# Patient Record
Sex: MALE | Race: WHITE
[De-identification: names, ages, dates, MRNs, and addresses within clinical notes are randomized per-mention and may not be internally consistent; named-entity substitution may affect disease eponyms.]

---

## 2019-01-08 ENCOUNTER — HOSPITAL ENCOUNTER (EMERGENCY)
Dept: HOSPITAL 25 - UCCORT | Age: 44
Discharge: ANOTHER HOSPITAL | End: 2019-01-08
Payer: MEDICARE

## 2019-01-08 VITALS — SYSTOLIC BLOOD PRESSURE: 156 MMHG | DIASTOLIC BLOOD PRESSURE: 100 MMHG

## 2019-01-08 DIAGNOSIS — F10.10: ICD-10-CM

## 2019-01-08 DIAGNOSIS — F17.210: ICD-10-CM

## 2019-01-08 DIAGNOSIS — R60.9: Primary | ICD-10-CM

## 2019-01-08 PROCEDURE — 99212 OFFICE O/P EST SF 10 MIN: CPT

## 2019-01-08 PROCEDURE — G0463 HOSPITAL OUTPT CLINIC VISIT: HCPCS

## 2019-01-08 NOTE — XMS REPORT
Continuity of Care Document (CCD)

 Created on:2018



Patient:Hernan Plummer

Sex:Male

:1975

External Reference #:[id number]





Demographics







 Address  [address] [place] [address]

 

 Home Phone  [phone]

 

 Mobile Phone  [phone]

 

 Work Phone  [phone]

 

 Preferred Language  en

 

 Marital Status  Not  or 

 

 Holiness Affiliation  Unknown

 

 Race  White

 

 Ethnic Group  Not  or 









Author







 Name  Herminia Barragan









Support







 Name  Relationship  Address  Phone

 

 Ernestine Plummer  Mother  Unavailable  [phone]









Care Team Providers







 Name  Role  Phone

 

 Graciela Ceballos MD  Care Team Information   Unavailable

 

 Graciela Ceballos MD  Primary Care Physician  Unavailable









Payers







 Type  Date  Identification Numbers  Payment Provider  Subscriber

 

     Policy Number: [id number]  [employer]  Herann Plummer









 PayID: [id number]  [address] [address]









 [place] [address]









     Policy Number: [id number]  Medicaid  Hernan Plummer









 PayID: [id number]  [address]









 [place] [address]









   Expires: 2011  Policy Number: [id number]  [employer]  Hernan Plummer









 PayID: [id number]  [address] [address]









 [place] [address]







Advance Directives







 Description

 

 No Information Available







Problems







 Date  Description  Provider  Status

 

 Onset: 2018  Secondary polycythemia  Mariella Singh DO  Active

 

 Onset: 2018  Tobacco use  Tc Thomson MD  Active

 

 Onset: 2018  Hemorrhage of rectum and anus  Tc Thomson MD  Active

 

 Onset: 2018  Iron deficiency  Mariella Singh DO  Active

 

 Onset: 2018  Vitamin D deficiency  Mariella Singh DO  Active

 

 Onset: 2018  Vitamin B deficiency  Mariella Singh DO  Active

 

 Onset: 2018  Heavy drinker  Mariella Singh DO  Active

 

 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  
Active



   distal lower extremity    

 

 Onset: 2016  Other fecal abnormalities  Peyton Botello MD  Active

 

 Onset: 2016  Neuralgia  Peyton Botello MD  Active

 

 Onset: 2016  Late effect of spinal cord injury  Peyton Botello MD  Active







Family History







 Date  Family Member(s)  Problem(s)  Comments

 

   Father   due to Liver Disease  ()

 

   Father  Cirrhosis  

 

   Father  Diabetes  

 

   Mother  Diverticulitis  

 

   Maternal Grandmother  Crohn's Disease  







Social History







 Type  Date  Description  Comments

 

 Birth Sex    Unknown  

 

 Marital Status    Patient is   

 

 Lives With    Alone  

 

 Home Environment    Lives With  young daughter

 

 Occupation    Disabled  [employer] -



       finding work



       placement for clients

 

 Work Status    Disabled  

 

 Smokeless Tobacco    Never Used Smokeless  



     Tobacco  

 

 ETOH Use    Uses Alcohol Daily  10 vodkas daily

 

 Tobacco Use  Start: Unknown  Patient is a current  



     smoker, smokes every  



     day  

 

 Recreational Drug Use    Marijuana  Daily

 

 Smoking Status  Reviewed: 18  Patient is a current  



     smoker, smokes every  



     day  







Allergies, Adverse Reactions, Alerts







 Description

 

 No Known Drug Allergies







Medications







 Medication  Date  Status  Form  Strength  Qnty  SIG  Indications  Ordering



                 Provider

 

 Lovenox    Active  Solution  40mg/0.4M  12ml  40 mg sq q12    Kai      L        Mariella,



                 

 

 Xarelto    Active  Tablets  20mg  30tab  1 tabl by            s  mouth every    Mariella,



             day    DO

 

 Omeprazole    Active  Capsules DR  20mg    1 by mouth    Unknown



   /          every day    

 

 Lyrica    Active  Capsules  100mg    1 cap by    Unknown



   /0000          mouth twice    



             a day    

 

 Tizanidine HCL    Active  Capsules  2mg    2 tab by    Unknown



   /0000          mouth at    



             bedtime    

 

 Flonase Allergy    Active  Suspension  50mcg/Act    2 spray each    
Unknown



 Relief  /0000          nare every    



             day    

 

 ALL Day Allergy    Active  Tablets  10mg    1 tablet by    Unknown



   /0000          mouth every    



             day    

 

 Flovent Diskus    Active  Aerosol  250mcg/Bl    use 1    Unknown



   /0000      ist    inhalation    



             twice a day    



             as needed    



             (max daily    



             dose: 2    



             inhalation)    

 

 Ventolin HFA    Active  Aerosol  108(90Bas    take 2 puffs    Unknown



   /0000      e)    every 6    



         mcg/Act    hours as    



             needed for    



             shortness of    



             breath.    

 

                 

 

 Golytely    Hx  Solution  236gm  4000m  drink half  K62.5  Berto



       Rec    l  the evening    , Tc,



   -          before and    MD



             half the    



   /2018          morning of    



             the    



             procedure (1    



             cup every    



             10')    

 

 Dulcolax    Hx  Tablets DR  5mg  4tabs  4 tablets  K62.5  Berto



             taken a 8pm    , Tc,



   -          the day    MD



             before the    



             procedure    

 

 Magnesium    Hx  Solution  1.745GM/3  296ml  one bottle  K62.5  Berto



 Citrate        0ML    at 12 noon    , Tc,



   -          the day    MD



             before    



             colonoscopy    

 

 Folic Acid    Hx  Tablets  1mg  30tab  1 tabl by    Bo,



           s  mouth every    Mariella,



   -          day    DO



                 

 

 Ergocarciferol    Hx  Capsules  85936P  6caps  1 cap po q    Boufal,



             week    Mariella,



   -              DO



                 

 

 Neurontin  10/21  Hx  Capsules  300mg  180ca  600 mg (2  M79.2  Cator,



           ps  tabs) by    MD Peyton



   -          mouth 3    



             times a day    



                 

 

 Neurontin  10/21  Hx  Capsules  300mg  180ca  600 mg (2  M79.2  Cator,



           ps  tabs) by    MD Peyton



   -          mouth 3    



             times a day    



                 

 

 Neurontin    Hx  Capsules  300mg  90cap  300 mg by  M79.2  Cator,



           s  mouth 3    MD Peyton



   -          times a day    



   10/21              



   /2016              

 

 Fluticasone    Hx  Suspension  50mcg/Act    1 spray to    Unknown



 Propionate  /0000          each nare    



   -          every day    



                 

 

 Xarelto    Hx  Tablets  15mg    Take 1    Unknown



   /0000          Tablet By    



   -          Mouth Twice    



             A Day For           Days    







Medications Administered in Office







 Medication  Date  Status  Form  Strength  Qnty  SIG  Indications  Ordering



                 Provider

 

 Vitamin B12    Administered  Injection          Boufal,



 Injection 1000  018              Mariella,



 mcg/Ml                DO

 

 Vitamin B12    Administered  Injection          Boufal,



 Injection 1000  018              Mariella,



 mcg/Ml                DO







Immunizations







 Description

 

 No Information Available







Vital Signs







 Date  Vital  Result  Comment

 

 2018  3:31pm  BP Systolic Sitting Left Arm  128 mmHg  









 BP Diastolic Sitting Left Arm  90 mmHg  

 

 Heart Rate  93 /min  

 

 Respiratory Rate  16 /min  

 

 Height  69 inches  5'9"

 

 Weight  194.00 lb  

 

 BMI (Body Mass Index)  28.6 kg/m2  

 

 BSA (Body Surface Area)  2.04 m2  

 

 Ideal body weight in kilograms  73 kg  

 

 O2 % BldC Oximetry  95 %  









 2018  9:30am  BP Systolic Sitting Right Arm  121 mmHg  









 BP Diastolic Sitting Right Arm  82 mmHg  

 

 Body Temperature  96.1 F  

 

 Heart Rate  100 /min  

 

 Respiratory Rate  20 /min  

 

 Height  69 inches  5'9"

 

 Weight  189.25 lb  

 

 BMI (Body Mass Index)  27.9 kg/m2  

 

 BSA (Body Surface Area)  2.02 m2  

 

 Ideal body weight in kilograms  73 kg  

 

 O2 % BldC Oximetry  93 %  

 

 Pain Level  7  Legs









 2018  9:39am  BP Systolic Sitting Left Arm  126 mmHg  









 BP Diastolic Sitting Left Arm  90 mmHg  

 

 Heart Rate  76 /min  

 

 Respiratory Rate  16 /min  

 

 Height  69 inches  5'9"

 

 Weight  189.00 lb  

 

 BMI (Body Mass Index)  27.9 kg/m2  

 

 BSA (Body Surface Area)  2.02 m2  

 

 Ideal body weight in kilograms  73 kg  









 2018 11:25am  BP Systolic  121 mmHg  Left









 BP Diastolic  79 mmHg  Left

 

 Body Temperature  98.6 F  

 

 Heart Rate  101 /min  

 

 Respiratory Rate  20 /min  

 

 O2 % BldC Oximetry  96 %  

 

 Pain Level  7  Back, Legs









 2018 11:40am  BP Systolic  104 mmHg  Left









 BP Diastolic  71 mmHg  Left

 

 Body Temperature  98.4 F  

 

 Heart Rate  90 /min  

 

 Respiratory Rate  20 /min  

 

 Weight  184.00 lb  

 

 O2 % BldC Oximetry  96 %  

 

 Pain Level  7  Spine









 2018  1:52pm  BP Systolic  119 mmHg  









 BP Diastolic  77 mmHg  

 

 Body Temperature  98.5 F  

 

 Heart Rate  105 /min  

 

 Respiratory Rate  18 /min  

 

 Weight  179.38 lb  

 

 O2 % BldC Oximetry  97 %  

 

 Pain Level  7  legs and feet









 2018 10:18am  BP Systolic  117 mmHg  









 BP Diastolic  79 mmHg  

 

 Body Temperature  98.3 F  

 

 Heart Rate  98 /min  

 

 Respiratory Rate  18 /min  

 

 Weight  179.12 lb  

 

 O2 % BldC Oximetry  97 %  

 

 Pain Level  7  All over









 2018 11:03am  BP Systolic  115 mmHg  









 BP Diastolic  75 mmHg  

 

 Body Temperature  97.8 F  

 

 Heart Rate  94 /min  

 

 Respiratory Rate  16 /min  

 

 Height  69.75 inches  5'9.75"

 

 Weight  181.00 lb  

 

 BMI (Body Mass Index)  26.2 kg/m2  

 

 BSA (Body Surface Area)  2.00 m2  

 

 Ideal body weight in kilograms  75 kg  

 

 O2 % BldC Oximetry  99 %  

 

 Pain Level  7  right leg









 10/21/2016  9:53am  BP Systolic Sitting Left Arm  122 mmHg  









 BP Diastolic Sitting Left Arm  78 mmHg  

 

 Body Temperature  99.0 F  

 

 Heart Rate  99 /min  

 

 Height  69 inches  5'9"

 

 Weight  183.00 lb  

 

 BMI (Body Mass Index)  27.0 kg/m2  

 

 BSA (Body Surface Area)  1.99 m2  

 

 O2 % BldC Oximetry  97 %  ra









 2016  8:43am  BP Systolic Sitting Left Arm  132 mmHg  









 BP Diastolic Sitting Left Arm  78 mmHg  

 

 Body Temperature  98.4 F  

 

 Heart Rate  98 /min  

 

 Height  69 inches  5'9"

 

 Weight  183.00 lb  

 

 BMI (Body Mass Index)  27.0 kg/m2  

 

 BSA (Body Surface Area)  1.99 m2  

 

 Ideal body weight in kilograms  73 kg  

 

 O2 % BldC Oximetry  98 %  ra







Results







 Test  Date  Facility  Test  Result  H/L  Range  Note

 

 CBC  2018  [facility]  White Blood  6.5 K/uL  N  3.4-10.5  1



 W/Automated    [address]  Count        



 [place] [address] [phone]          









 Red Blood Count  5.99 M/uL  High  4.20-5.80  

 

 Hemoglobin  17.4 gm/dL  High  12.8-17.0  

 

 Hematocrit  52.5 %  High  38.0-48.0  

 

 Mean Cell Volume  87.6 fl  N  80.0-96.0  

 

 Mean Corpuscular HGB  29.0 pg  N  27.0-33.0  

 

 Mean Corpuscular HGB Conc  33.1 g/dL  N  31.7-36.0  

 

 Platelet Count  159 K/uL  N  155-360  

 

 Red Cell Distri Width SD  48.3 fl  N  36-51  

 

 Red Cell Distri Width %CV  15.1 %  N  11.6-15.8  

 

 Mean Platelet Volume  10.0 fL  N  6.6-10.6  

 

 Neut%  70.3 %  N  33.0-73.0  

 

 Lymph %  17.0 %  Low  20.0-42.0  

 

 Mono %  10.4 %  High  0.0-10.0  

 

 Eo%  1.7 %  N  0.0-6.6  

 

 Bas%  0.6 %  N  0.0-1.1  

 

 Neut#  4.55 K/uL  N  1.8-7.0  

 

 Lymph #  1.10 K/uL  N  1.0-4.0  

 

 Mono #  0.67 K/uL  N  0.0-0.8  

 

 Eos #  0.11 K/uL  N  0.0-0.5  

 

 Baso #  0.04 K/uL  N  0.0-0.1  









 Iron-Tibc-%Sat  2018  [facility]  Serum Iron  56 g/dL  Low    



     [address] [place] [address] [phone]          









 Total Iron Binding Capacity  499 g/dL  High  250-450  

 

 Transferrin %Saturation  11 %  Low  12-57  









 Laboratory test  2018  [facility]  Ferritin  16 ng/mL  Low    



 finding    [address] [place] [address] [phone]          

 

 Comprehensive  10/25/2018  [facility]  Glucose  132 mg/dL  High    2



 Metabolic Panel    [address] [place] [address] [phone]          









 BUN  6 mg/dL  Low  7-18  

 

 Creatinine  1.0 mg/dL  N  0.6-1.3  

 

 Glom Filtration Rate, Estimate  >60 mL/min    >60  

 

 If African American  >60 mL/min    >60  3

 

 BUN/Creat  6.0 ratio      

 

 Sodium  137 mmol/L  N  136-145  

 

 Potassium  3.5 mmol/L  N  3.5-5.1  

 

 Chloride  96 mmol/L  Low    

 

 Carbon Dioxide  32 mmol/L  N  21-32  

 

 Anion Gap  9 mEq/L  N  8-16  

 

 Calcium  8.7 mg/dL  N  8.5-10.1  

 

 Total Protein  7.0 g/dL  N  6.4-8.2  

 

 Albumin  3.5 g/dL  N  3.4-5.0  

 

 Globulin  3.5 g/dL  N  1.9-4.3  

 

 Alb/Glob  1.0 ratio      

 

 Bilirubin,Total  1.3 mg/dL  High  0.2-1.0  

 

 Sgot/Ast  67 U/L  High  15-37  

 

 SGPT/Alt  59 U/L  N  12-78  

 

 Alkaline Phosphatase  95 U/L  N    









 CBS W/Automated Diff  10/25/2018  [facility]  White Blood  9.1 K/uL  N  3.4-10.5  



     [address] [place] [address] [phone]          









 Red Blood Count  6.01 M/uL  High  4.20-5.80  

 

 Hemoglobin  17.7 gm/dL  High  12.8-17.0  

 

 Hematocrit  53.7 %  High  38.0-48.0  

 

 Mean Cell Volume  89.4 fl  N  80.0-96.0  

 

 Mean Corpuscular HGB  29.5 pg  N  27.0-33.0  

 

 Mean Corpuscular HGB Conc  33.0 g/dL  N  31.7-36.0  

 

 Platelet Count  130 K/uL    155-360  

 

 Red Cell Distri Width SD  50.0 fl  N  36-51  

 

 Red Cell Distri Width %CV  15.5 %  N  11.6-15.8  

 

 Mean Platelet Volume  11.1 fL    6.6-10.6  

 

 Neut%  80.6 %  High  33.0-73.0  

 

 Lymph %  10.0 %  Low  20.0-42.0  

 

 Mono %  8.5 %  N  0.0-10.0  

 

 Eo%  0.7 %  N  0.0-6.6  

 

 Bas%  0.2 %  N  0.0-1.1  

 

 Neut#  7.31 K/uL  High  1.8-7.0  

 

 Lymph #  0.91 K/uL  Low  1.0-4.0  

 

 Mono #  0.77 K/uL  N  0.0-0.8  

 

 Eos #  0.06 K/uL  N  0.0-0.5  

 

 Baso #  0.02 K/uL  N  0.0-0.1  









 Iron-Tibc-%Sat  10/25/2018  [facility]  Serum Iron  134 g/dL  N    



     [address], [place] [id number] [phone]          









 Total Iron Binding Capacity  461 g/dL  High  250-450  

 

 Transferrin %Saturation  29 %  N  12-57  









 Laboratory test  10/25/2018  [facility]  Ferritin  37 ng/mL  N    



 finding    [address], [place] [id number] [phone]          

 

 Vitamin B12 And  10/25/2018  [facility]  Vitamin B12  438 pg/mL  N  193-986  



 Folate    [address] [place] [address] [phone]          









 Folic Acid  19.4 ng/mL  High  3.1-17.5  









 Slide Review  10/25/2018  [facility]  Slide Review  .      4



     [address] [place] [address] [phone]          

 

 Comprehensive  2018  [facility]  Glucose  107 mg/dL  High    5



 Metabolic Panel    [address] [place] [address] [phone]          









 BUN  2 mg/dL  Low  7-18  

 

 Creatinine  0.8 mg/dL  N  0.6-1.3  

 

 Glom Filtration Rate, Estimate  >60 mL/min    >60  

 

 If African American  >60 mL/min    >60  6

 

 BUN/Creat  2.5 ratio      

 

 Sodium  133 mmol/L  Low  136-145  

 

 Potassium  3.6 mmol/L  N  3.5-5.1  

 

 Chloride  93 mmol/L  Low    

 

 Carbon Dioxide  33 mmol/L  High  21-32  

 

 Anion Gap  7 mEq/L  Low  8-16  

 

 Calcium  8.4 mg/dL  Low  8.5-10.1  

 

 Total Protein  6.7 g/dL  N  6.4-8.2  

 

 Albumin  3.4 g/dL  N  3.4-5.0  

 

 Globulin  3.3 g/dL  N  1.9-4.3  

 

 Alb/Glob  1.0 ratio      

 

 Bilirubin,Total  1.3 mg/dL  High  0.2-1.0  

 

 Sgot/Ast  77 U/L  High  15-37  

 

 SGPT/Alt  73 U/L  N  12-78  

 

 Alkaline Phosphatase  93 U/L  N    









 CBS W/Automated Diff  2018  [facility]  White Blood  7.2 K/uL  N  3.4-10.5  



     [address]  Count        



     [place], [place] [id number] [phone]          









 Red Blood Count  5.75 M/uL  N  4.20-5.80  

 

 Hemoglobin  16.7 gm/dL  N  12.8-17.0  

 

 Hematocrit  48.8 %  High  38.0-48.0  

 

 Mean Cell Volume  84.9 fl  N  80.0-96.0  

 

 Mean Corpuscular HGB  29.0 pg  N  27.0-33.0  

 

 Mean Corpuscular HGB Conc  34.2 g/dL  N  31.7-36.0  

 

 Platelet Count  122 K/uL  Low  155-360  

 

 Red Cell Distri Width SD  54.9 fl  High  36-51  

 

 Red Cell Distri Width %CV  18.5 %  High  11.6-15.8  

 

 Mean Platelet Volume  10.3 fL  N  6.6-10.6  

 

 Neut%  73.6 %  High  33.0-73.0  

 

 Lymph %  13.2 %  Low  20.0-42.0  

 

 Mono %  11.3 %  High  0.0-10.0  

 

 Eo%  1.5 %  N  0.0-6.6  

 

 Bas%  0.4 %  N  0.0-1.1  

 

 Neut#  5.27 K/uL  N  1.8-7.0  

 

 Lymph #  0.95 K/uL  Low  1.0-4.0  

 

 Mono #  0.81 K/uL  High  0.0-0.8  

 

 Eos #  0.11 K/uL  N  0.0-0.5  

 

 Baso #  0.03 K/uL  N  0.0-0.1  









 Iron-Tibc-%Sat  2018  [facility]  Serum Iron  41 g/dL  Low    



     [address], [place] [id number] [phone]          









 Total Iron Binding Capacity  483 g/dL  High  250-450  

 

 Transferrin %Saturation  8 %  Low  12-57  









 Laboratory test  2018  [facility]  Ferritin  129 ng/mL  N    



 finding    [address] [place] [address] [phone]          

 

 Vitamin B12 And  2018  [facility]  Vitamin B12  504 pg/mL  N  193-986  



 Folate    [address] [place] [address] [phone]          









 Folic Acid  > 20.0 ng/mL  High  3.1-17.5  









 Comprehensive Metabolic  2018  [facility]  Glucose  90 mg/dL  N    7



 Panel    [address], [place] [id number] [phone]          









 BUN  2 mg/dL  Low  7-18  

 

 Creatinine  0.8 mg/dL  N  0.6-1.3  

 

 Glom Filtration Rate, Estimate  >60 mL/min    >60  

 

 If African American  >60 mL/min    >60  8

 

 BUN/Creat  2.5 ratio      

 

 Sodium  135 mmol/L  Low  136-145  

 

 Potassium  3.6 mmol/L  N  3.5-5.1  

 

 Chloride  96 mmol/L  Low    

 

 Carbon Dioxide  28 mmol/L  N  21-32  

 

 Anion Gap  11 mEq/L  N  8-16  

 

 Calcium  8.6 mg/dL  N  8.5-10.1  

 

 Total Protein  7.1 g/dL  N  6.4-8.2  

 

 Albumin  3.6 g/dL  N  3.4-5.0  

 

 Globulin  3.5 g/dL  N  1.9-4.3  

 

 Alb/Glob  1.0 ratio      

 

 Bilirubin,Total  1.5 mg/dL  High  0.2-1.0  

 

 Sgot/Ast  80 U/L  High  15-37  

 

 SGPT/Alt  62 U/L  N  12-78  

 

 Alkaline Phosphatase  103 U/L  N    









 CBS W/Automated Diff  2018  [facility]  White Blood  7.9 K/uL  N  3.4-10.5  



     [address]  Count        



     [place] [address] [phone]          









 Red Blood Count  5.79 M/uL  N  4.20-5.80  

 

 Hemoglobin  16.8 gm/dL  N  12.8-17.0  

 

 Hematocrit  48.9 %  High  38.0-48.0  

 

 Mean Cell Volume  84.5 fl  N  80.0-96.0  

 

 Mean Corpuscular HGB  29.0 pg  N  27.0-33.0  

 

 Mean Corpuscular HGB Conc  34.4 g/dL  N  31.7-36.0  

 

 Platelet Count  121 K/uL  Low  155-360  

 

 Red Cell Distri Width SD  55.4 fl  High  36-51  

 

 Red Cell Distri Width %CV  19.3 %  High  11.6-15.8  

 

 Mean Platelet Volume  10.7 fL  High  6.6-10.6  

 

 Neut%  73.7 %  High  33.0-73.0  

 

 Lymph %  13.7 %  Low  20.0-42.0  

 

 Mono %  10.6 %  High  0.0-10.0  

 

 Eo%  1.5 %  N  0.0-6.6  

 

 Bas%  0.5 %  N  0.0-1.1  

 

 Neut#  5.84 K/uL  N  1.8-7.0  

 

 Lymph #  1.09 K/uL  N  1.0-4.0  

 

 [place] #  0.84 K/uL  High  0.0-0.8  

 

 Eos #  0.12 K/uL  N  0.0-0.5  

 

 Baso #  0.04 K/uL  N  0.0-0.1  









 Laboratory test  2018  [facility]  Vitamin  54.3    30.0-100.0  9



 finding    [address]  D,25-Hydroxy  ng/mL      



     [place] [address] [phone]          

 

 Vitamin B12 And  2018  [facility]  Vitamin B12  359 pg/mL  N  193-986  



 Folate    [address]          



     [place] [address] [phone]          









 Folic Acid  > 20.0 ng/mL  High  3.1-17.5  









 Laboratory test  2018  [facility]  Ferritin  126 ng/mL  N    



 finding    [address]          



     [place] [address] [phone]          

 

 Iron-Tibc-%Sat  2018  [facility]  Serum Iron  42 g/dL  Low    



     [address] [place] [address]          



     [phone]          









 Total Iron Binding Capacity  525 g/dL  High  250-450  

 

 Transferrin %Saturation  8 %  Low  12-57  









 Laboratory test  2018  [facility]  Ferritin  12 ng/mL  Low    10



 finding    [address] [place] [address]          



     [phone]          

 

 Vitamin B12 And  2018  [facility]  Vitamin B12  310 pg/mL  N  193-986  



 Folate    [address] [place] [address]          



     [phone]          









 Folic Acid  5.1 ng/mL  N  3.1-17.5  









 Laboratory  2018  [facility]  Vitamin  17.6  Low  30.0-100.0  11



 test finding    [address]  D,25-Hydroxy  ng/mL      



     [place] [address]          



     [phone]          









 Sedimentation Rate  1 mm/hr  N  0-15  12

 

 Gamma Glutamyl Transpeptidase  48 U/L  N  5-85  

 

 Fibrinogen  286 mg/dL  N  200-467  13









 Factor V Leiden Mutation  2018  [facility]  Factor V  (SEE      14



     [address]  [place]  NOTE)      



     [place] [address]          



     [phone]          

 

 Methylenetetrahydrofolate  2018  [facility]  MTHFR,Dna  (SEE      15



 Redu    [address]  Analysis  NOTE)      



     [place] [address]          



     [phone]          

 

 Protein Electro.,S  2018  [facility]  Protein,Tot  5.9 g/dL  Low  6.0  



     [address] [facility]  al,Serum      -8.  



     [place] [address]        5  



     [phone]          









 Albumin  3.1 g/dL    2.9-4.4  

 

 Alpha-1-Globulin  0.3 g/dL    0.0-0.4  

 

 Alpha-2-Globulin  0.9 g/dL    0.4-1.0  

 

 Beta Globulin  0.9 g/dL    0.7-1.3  

 

 Gamma Globulin  0.8 g/dL    0.4-1.8  

 

 M-Surinder  Not Observed g/dL    Not Observed  

 

 Globulin, Total  2.8 g/dL    2.2-3.9  

 

 A/G Ratio  1.1    0.7-1.7  

 

 Please Note:  (SEE NOTE)      16

 

 P E Interpretation, Serum  (SEE NOTE)      17









 Iron-Tibc-%Sat  2018  [facility]  Serum Iron  20 g/dL  Low    



     [address] [place] [address] [phone]          









 Total Iron Binding Capacity  489 g/dL  High  250-450  

 

 Transferrin %Saturation  4 %  Low  12-57  









 CBS W/Automated Diff  2018  [facility]  White Blood  4.6 K/uL  N  3.4-10.5  



     [address]  Count        



     [place] [address] [phone]          









 Red Blood Count  5.68 M/uL  N  4.20-5.80  

 

 Hemoglobin  16.7 gm/dL  N  12.8-17.0  

 

 Hematocrit  50.0 %  High  38.0-48.0  

 

 Mean Cell Volume  88.0 fl  N  80.0-96.0  

 

 Mean Corpuscular HGB  29.4 pg  N  27.0-33.0  

 

 Mean Corpuscular HGB Conc  33.4 g/dL  N  31.7-36.0  

 

 Platelet Count  181 K/uL  N  155-360  

 

 Red Cell Distri Width SD  54.1 fl  High  36-51  

 

 Red Cell Distri Width %CV  16.8 %  High  11.6-15.8  

 

 Mean Platelet Volume  9.7 fL  N  6.6-10.6  

 

 Neut%  63.2 %  N  33.0-73.0  

 

 Lymph %  22.7 %  N  20.0-42.0  

 

 Mono %  10.4 %  High  0.0-10.0  

 

 Eo%  2.4 %  N  0.0-6.6  

 

 Bas%  1.3 %  High  0.0-1.1  

 

 Neut#  2.92 K/uL  N  1.8-7.0  

 

 Lymph #  1.05 K/uL  N  1.0-4.0  

 

 Mono #  0.48 K/uL  N  0.0-0.8  

 

 Eos #  0.11 K/uL  N  0.0-0.5  

 

 Baso #  0.06 K/uL  N  0.0-0.1  









 Comprehensive Metabolic  2018  [facility]  Glucose  79 mg/dL  N    



 Panel    [address] [place] [address] [phone]          









 BUN  3 mg/dL  Low  7-18  

 

 Creatinine  0.7 mg/dL  N  0.6-1.3  

 

 Glom Filtration Rate, Estimate  >60 mL/min    >60  

 

 If African American  >60 mL/min    >60  18

 

 BUN/Creat  4.2 ratio      

 

 Sodium  140 mmol/L  N  136-145  

 

 Potassium  3.6 mmol/L  N  3.5-5.1  

 

 Chloride  98 mmol/L  N    

 

 Carbon Dioxide  31 mmol/L  N  21-32  

 

 Anion Gap  11 mEq/L  N  8-16  

 

 Calcium  8.3 mg/dL  Low  8.5-10.1  

 

 Total Protein  6.6 g/dL  N  6.4-8.2  

 

 Albumin  3.1 g/dL  Low  3.4-5.0  

 

 Globulin  3.5 g/dL  N  1.9-4.3  

 

 Alb/Glob  0.9 ratio      

 

 Bilirubin,Total  0.5 mg/dL  N  0.2-1.0  

 

 Sgot/Ast  60 U/L  High  15-37  

 

 SGPT/Alt  52 U/L  N  12-78  

 

 Alkaline Phosphatase  116 U/L  N    









 1  I82.401

 

 2  E61.1

 

 3  Note:



   Persistent reduction for 3 months or more in an eGFR <60



   mL/min/1.73 m2 defines CKD.  Patients with eGFR values >/=60



   mL/min/1.73 m2 may also have CKD if evidence of persistent



   proteinuria is present.



   



   The original MDRD equation for estimated GFR is not valid



   for patients less than 18 years of age.



   



   Additional information may be found at www.k[facility].org.

 

 4  Instrument flagged sample for slide review.



   Less than 10% Bands seen, no other immature WBC's seen.



   RBC morphology essentially normal.



   Platelet estimate=NORMAL

 

 5  E61.1 E55.9 E53.9 I82.401

 

 6  Note:



   Persistent reduction for 3 months or more in an eGFR <60



   mL/min/1.73 m2 defines CKD.  Patients with eGFR values >/=60



   mL/min/1.73 m2 may also have CKD if evidence of persistent



   proteinuria is present.



   



   The original MDRD equation for estimated GFR is not valid



   for patients less than 18 years of age.



   



   Additional information may be found at www.kdoqi.org.

 

 7  E61.1 E61.1 S24.152S I.82.401

 

 8  Note:



   Persistent reduction for 3 months or more in an eGFR <60



   mL/min/1.73 m2 defines CKD.  Patients with eGFR values >/=60



   mL/min/1.73 m2 may also have CKD if evidence of persistent



   proteinuria is present.



   



   The original MDRD equation for estimated GFR is not valid



   for patients less than 18 years of age.



   



   Additional information may be found at www.kdoqi.org.

 

 9  Vitamin D deficiency has been defined by the [place] of



   Medicine and an Endocrine Society practice guideline as a



   level of serum 25-OH vitamin D less than 20 ng/mL (1,2).



   The Endocrine Society went on to further define vitamin D



   insufficiency as a level between 21 and 29 ng/mL (2).



   1. IOM ([place] [employer]). 2010. Dietary reference



   intakes for calcium and D. [place]: The



   National Academies Press.



   2. Gabino Clark, Briseyda GREWAL, et al.



   Evaluation, treatment, and prevention of vitamin D



   deficiency: an Endocrine Society clinical practice



   guideline. JCEM. 2011; 96(7):1911-30.



   Performed at:   [facility]  [id number]



   : Araceli B Reyes MD, Phone:  [phone]

 

 10  L71.841S,E42.816

 

 11  Vitamin D deficiency has been defined by the [place] of



   Medicine and an Endocrine Society practice guideline as a



   level of serum 25-OH vitamin D less than 20 ng/mL (1,2).



   The Endocrine Society went on to further define vitamin D



   insufficiency as a level between 21 and 29 ng/mL (2).



   1. IOM ([place]). 2010. Dietary reference



   intakes for calcium and D. [place]: The



   National Academies Press.



   2. Gabino Clark Bischoff-Ferrari HA, et al.



   Evaluation, treatment, and prevention of vitamin D



   deficiency: an Endocrine Society clinical practice



   guideline. JCEM. 2011; 96(7):1911-30.



   Performed at:  [facility]  [id number]



   : Araceli B Reyes MD, Phone:  [phone]

 

 12  Method: Sediplast Modified Westergren

 

 13  Is patient on heparin protocol? N



   Is patient on anticoagulants? <Blank>

 

 14  Result:  Negative (no mutation found)



   Factor V Leiden is a specific mutation (R506Q) in the factor



   V gene that is associated with an increased risk of venous



   thrombosis. Factor V Leiden is more resistant to



   inactivation by activated protein C.  As a result, factor V



   persists in the circulation leading to a mild hyper-



   coagulable state.  The Leiden mutation accounts for 90% -



   95% of APC resistance.  Factor V Leiden has been reported in



   patients with deep vein thrombosis, pulmonary embolus,



   central retinal vein occlusion, cerebral sinus thrombosis



   and hepatic vein thrombosis. Other risk factors to be



   considered in the workup for venous thrombosis include the



   D36670P mutation in the factor II (prothrombin) gene,



   protein S and C deficiency, and antithrombin deficiencies.



   Anticardiolipin antibody and lupus anticoagulant analysis



   may be appropriate for certain patients, as well as



   homocysteine levels.



   Contact your local [facility] for information on how to order



   additional testing if desired.



   **Genetic counselors are available for health care



   providers to**   discuss results at [phone]).



   Methodology:



   DNA analysis of the Factor V gene was performed by allele-



   specific PCR. The diagnostic sensitivity and specificity is



   >99% for both. Molecular-based testing is highly accurate,



   but as in any laboratory test, diagnostic errors may occur.



   All test results must be combined with clinical information



   for the most accurate interpretation.



   This test was developed and its performance characteristics



   determined by [facility]. It has not been cleared or approved



   by the Food and Drug Administration.



   References:



   Robbie NARANJO (1996).  Clin Lab Med 16:169-186.



   Ania Enrique, PhD, FACMG



   Azul Acosta, PhD, FACMG



   Nette Gray M.S., PhD, FACMG



   Sarai Barriga, PhD, FACMG



   Es Muro, PhD, FACMG



   Beni Moura PhD, FAC



   Performed at:  [facility]



   [address], [place], [place]  [id number]



   : Toni Thomas MD, Phone:  [phone]

 

 15  Result: NEGATIVE (No mutation identified)



   Interpretation:



   The MTHFR C677T and R6860S variants were not identified.



   While the individual does not possess either of these



   factors, other risk factors may be detected through



   systematic clinical laboratory analysis.



   Hyperhomocysteinemia may occur due to mutations in enzymes



   other than MTHFR that are involved in homocysteine



   metabolism, or arise due to acquired factors such as



   folate, vitamin B6 or vitamin B12 deficiency. Therefore,



   this individual's MTHFR result does not guarantee a normal



   homocysteine level. In the evaluation of vascular and



   obstetric risk, consider measuring fasting homocysteine.



   Methylenetetrahydrofolate reductase (MTHFR) is a key enzyme in the



   folate pathway and is responsible for the metabolism of homocysteine.



   There are two common variants in the MTHFR gene, c.655c>T



   (p.[id number]), referred to as C677T, and c.1286A>C (p.Kmv553Wcq),



   referred to as J1569F. Individuals homozygous for C677T (two copies



   of the variant), have decreased activity of the MTHFR enzyme and a



   predisposition to hyperhomocysteinemia, particularly when deficient in



   folate. Hyperhomocysteinemia is a risk factor for venous thrombosis



   and coronary artery disease and is associated with an increased risk



   of fetal open neural tube defects. The C677T variant does not



   independently increase risk of these conditions in the absence of



   hyperhomocysteinemia. The E4878M variant is not associated with



   elevated homocysteine levels unless a C677T variant is also present;



   however, the clinical significance of heterozygosity for both C677T



   and E7399W is controversial. Population data suggest that these two



   variants are not present on the same chromosome, but rare exceptions



   have been reported of triple variant MTHFR genotypes (ie. homozygous



   for one variant and heterozygous for the other). Homozygosity for



   C677T has an estimated frequency of 10% to 15% in Caucasians and 25%



   in Hispanics.



   Additional information:



   Dietary folic acid, B6 and B12 supplementation has been suggested to



   lower homocysteine levels in some people. Folic acid supplementation



   has been shown to reduce the occurrence of neural tube defects.



   Genetic counselors are available for health care providers to discuss



   results at [phone].



   Methodology:



   DNA analysis of the MTHFR gene was performed by PCR



   amplification followed by restriction analysis.  The



   diagnostic sensitivity is >99% for both. Molecular-based



   testing is highly accurate, but as in any laboratory test,



   rare diagnostic errors may occur.  All test results must be



   combined with clinical information for the most accurate



   interpretation.



   This test was developed and its performance characteristics



   determined by [employer]. It has not been cleared or approved by



   the Food and Drug Administration.



   References:



   Hal DEL TORO, Bandar Q. Am J Epidemiol 2000; 151(9):862-877.



   Bessie RAO, Caprini JA. Arch Pathol Lab Med 2007; 131(6):872-884.



   Frosst P et al. Dalia Mae 1995; 10(1):111-113.



   Hickey SE et al. Mae Med 2013; 15(2):153-156.



   Hinton C et al. Obstet Gynecol 2011; 118(3):730-740.



   Aníbal B et al. Eur J Epidemiol 2013; 28(8):621-647.



   Aina Enrique, PhD, FACMG



   Azul Acosta, PhD, FACMG



   JOSAFAT TannerSLeigh Ann, PhD, FACMG



   Sarai Barriga, PhD, FACMG



   Es Muro, PhD, FAC



   Beni Moura, PhD, FACMG



   Performed at:   - [facility] [address]  [id number]



   : Toni Thomas MD, Phone:  [phone]

 

 16  Protein electrophoresis scan will follow via computer,



   mail, or  delivery.

 

 17  The SPE pattern appears essentially unremarkable. Evidence



   of monoclonal protein is not apparent.



   Performed at:   - [facility] [address]  [id number]



   : Araceli B Reyes MD, Phone:  [phone]

 

 18  Note:



   Persistent reduction for 3 months or more in an eGFR <60



   mL/min/1.73 m2 defines CKD.  Patients with eGFR values >/=60



   mL/min/1.73 m2 may also have CKD if evidence of persistent



   proteinuria is present.



   



   The original MDRD equation for estimated GFR is not valid



   for patients less than 18 years of age.



   



   Additional information may be found at www.kdoqi.org.







Procedures







 Date  Code  Description  Status

 

 2018  37490  Colonoscopy With Polypectomy  Completed

 

 2018  35535  EGD With Biopsy  Completed

 

 2018  [id number]  Colonoscopy  Completed

 

 2018  34349  Theraputic Or Diagnostic Injection  Completed

 

 2018  00972  Theraputic Or Diagnostic Injection  Completed

 

 2004  35577  Exc Benign Lesion Except Skin Tag-Trunk,Arm,Or Legs 1.1  
Completed



     To 2.0CM  







Encounters







 Type  Date  Location  Provider  Dx  Diagnosis

 

 Office Visit  2018  Tc Amaya MD  D12.5  Benign neoplasm of



   3:15p        sigmoid colon









 K62.5  Hemorrhage of anus and rectum

 

 R10.12  Left upper quadrant pain

 

 K29.80  Duodenitis without bleeding









 Office Visit  2018  9:30a  Oncology Office  [place],  I82.401  Acute 
embolism



       Mariella, DO    and thombos



           unsp deep veins



           of r low extrem









 E61.1  Iron deficiency

 

 K62.5  Hemorrhage of anus and rectum

 

 E53.9  Vitamin B deficiency, unspecified

 

 E55.9  Vitamin D deficiency, unspecified

 

 D75.1  Secondary polycythemia









 Office Visit  2018  9:45a  Tc Amaya MD  E61.1  Iron 
deficiency









 K62.5  Hemorrhage of anus and rectum

 

 Z72.0  Tobacco use









 Office Visit  2018 11:30a  Infusion Center  [place]  I82.401  
Acute embolism



       B., NP    and thombos



           unsp deep veins



           of r low extrem









 E61.1  Iron deficiency

 

 K62.5  Hemorrhage of anus and rectum









 Office Visit  2018 12:00p  Infusion Center  [place]  I82.401  
Acute embolism



       B., NP    and thombos



           unsp deep veins



           of r low extrem









 E61.1  Iron deficiency









 Office Visit  2018  2:00p  Infusion Center  [place]  I82.401  
Acute embolism



       B., NP    and thombos



           unsp deep veins



           of r low extrem









 E61.1  Iron deficiency









 Office Visit  2018 10:30a  Oncology Office  [place],  I82.401  Acute 
embolism



       Mariella, DO    and thombos



           unsp deep veins



           of r low extrem









 E53.9  Vitamin B deficiency, unspecified

 

 E55.9  Vitamin D deficiency, unspecified

 

 E61.1  Iron deficiency









 Office Visit  2018 10:45a  Oncology Office  [place],  I82.401  Acute 
embolism



       Mariella, DO    and thombos



           unsp deep veins



           of r low extrem









 F10.10  Alcohol abuse, uncomplicated









 Office Visit  10/21/2016 10:00a  Physical Medicine  [place],  S24.152S  
Oth incomplete



     & Infectious  MD    lesion at T2-T6,



     Disease      sequela









 M79.2  Neuralgia and neuritis, unspecified









 Office Visit  2016  8:30a  Physical Medicine  [place],  S24.152S  
[facility] incomplete



     & Infectious  MD    lesion at T2-T6,



     Disease      sequela









 R19.5  Other fecal abnormalities

 

 M79.2  Neuralgia and neuritis, unspecified







Plan of Treatment

2018 - Tc Thomson, MDD12.5 Benign neoplasm of sigmoid colonComments:
3 + cm tubulovillous adenomarepeat in 2019K62.5 Hemorrhage of anus and 
rectumComments:will need repeat colonoscopyFollow up:2019R10.12 Left 
upper quadrant painNew Xrays:CT, Abdomen &amp; Pelvis W/O Contrast, Ordered: Comments:order ct scanK29.80 Duodenitis without bleedingNew Labs:Celiac 
Disease Comp AB Profile, Ordered: 18Comments:lymphocytic duodenitis

## 2019-01-08 NOTE — UC
UC General HPI





- HPI Summary


HPI Summary: 





edema of bilateral legs and and face x 3 days


denies any chest pain , no sob, no cough ,


no abdominal pain , pt. has a history of alcoholism 


heavy drinking daily


no fever, no chills , had similar symptoms few months ago and his symptoms 

improved by diuretics  


 





- History of Current Complaint


Chief Complaint: UCGeneralIllness


Stated Complaint: BILATERAL LEG COMPLAINT


Hx Obtained From: Patient


Onset/Duration: Gradual Onset, Lasting Days - 3, Still Present


Timing: Constant


Onset Severity: Moderate


Current Severity: Severe


Pain Intensity: 7


Associated Signs & Symptoms: Positive: Edema.  Negative: Agitation, Abdominal 

Pain, Anticoagulation Therapy, Back Pain, Confusion, Cough, Chest Pain, 

Decreased Responsiveness, Dizziness, Diarrhea, Dysuria, Decreased Oral Intake, 

Diaphoresis, Fever, Headache, Hematemesis, Hemoptysis, Immunocompromised, In-

Dwelling Medication Device, Melena, Nausea, Palpitations, Recent Medication 

Changes, Syncope, SOB, Trauma, Vomiting, Wheezing, Weakness





- Allergy/Home Medications


Allergies/Adverse Reactions: 


 Allergies











Allergy/AdvReac Type Severity Reaction Status Date / Time


 


No Known Allergies Allergy   Verified 01/08/19 09:24











Home Medications: 


 Home Medications





Albuterol HFA INHALER* [Ventolin HFA Inhaler*] 1 - 2 puff INH Q4H PRN 01/08/19 [

History Confirmed 01/08/19]


Fluticasone-Salmeterol 250-50* [Advair Diskus 250-50*] 1 puff INH QAM 01/08/19 [

History Confirmed 01/08/19]


Pregabalin CAP(*) [Lyrica CAP(*)] 50 mg PO BID 01/08/19 [History Confirmed 01/08 /19]











PMH/Surg Hx/FS Hx/Imm Hx





- Additional Past Medical History


Additional PMH: 





alcoholism 


chronic back pain 


GI/ History: Gastroesophageal Reflux





- Surgical History


Surgical History: Yes


Surgery Procedure, Year, and Place: back surgeries x3





- Family History


Known Family History: Positive: None


   Negative: Blood Disorder





- Social History


Alcohol Use: "at least ten shots a day"


Alcohol Amount: 1/2 bottle vodka


Substance Use Type: Marijuana


Substance Use Comment - Amount & Last Used: Daily


Smoking Status (MU): Heavy Every Day Tobacco Smoker


Type: Cigarettes


Amount Used/How Often: 1 PPD


Length of Time of Smoking/Using Tobacco: Since Age 16





Review of Systems


All Other Systems Reviewed And Are Negative: Yes


Constitutional: Positive: Negative


Skin: Positive: Negative


Eyes: Positive: Negative


ENT: Positive: Negative


Respiratory: Positive: Negative


Is Patient Immunocompromised?: No





Physical Exam


Triage Information Reviewed: Yes


Appearance: Ill-Appearing, Pain Distress


Vital Signs: 


 Initial Vital Signs











Temp  98.7 F   01/08/19 09:22


 


Pulse  80   01/08/19 09:22


 


Resp  18   01/08/19 09:22


 


BP  156/100   01/08/19 09:22


 


Pulse Ox  99   01/08/19 09:22











Vital Signs Reviewed: Yes


Eye Exam: Normal


Eyes: Positive: Conjunctiva Clear


ENT: Positive: Normal ENT inspection, Hearing grossly normal, Pharynx normal


Neck: Positive: Supple, Nontender, No Lymphadenopathy


Respiratory: Positive: Chest non-tender, Lungs clear, Normal breath sounds


Cardiovascular: Positive: RRR, No Murmur, Pulses Normal


Abdomen Description: Positive: Nontender, Soft, Distended.  Negative: CVA 

Tenderness (R), CVA Tenderness (L), Guarding


Bowel Sounds: Positive: Present


Musculoskeletal: Positive: Other: - lower ext : +2 edema R > L


Neurological Exam: Normal


Neurological: Positive: Alert





Course/Dx





- Diagnoses


Provider Diagnosis: 


 Lower extremity edema, Alcoholism /alcohol abuse








Discharge





- Sign-Out/Discharge


Documenting (check all that apply): Patient Departure


All imaging exams completed and their final reports reviewed: No Studies





- Discharge Plan


Condition: Good


Disposition: TRANS HIGHER [facility] OF CARE FAC


Patient Education Materials:  Leg Edema (ED)


Referrals: 


Graciela Ceballos MD [Primary Care Provider] - 


Additional Instructions: 


please go to [facility] ED 


for evaluation and tx


your would need blood work and more detailed work up  





- Billing Disposition and Condition


Condition: GOOD


Disposition: Trans Higher l of Care Fac

## 2019-12-26 ENCOUNTER — HOSPITAL ENCOUNTER (EMERGENCY)
Dept: HOSPITAL 25 - UCCORT | Age: 44
Discharge: HOME | End: 2019-12-26
Payer: MEDICARE

## 2019-12-26 VITALS — DIASTOLIC BLOOD PRESSURE: 75 MMHG | SYSTOLIC BLOOD PRESSURE: 129 MMHG

## 2019-12-26 DIAGNOSIS — L02.213: Primary | ICD-10-CM

## 2019-12-26 DIAGNOSIS — F17.210: ICD-10-CM

## 2019-12-26 PROCEDURE — G0463 HOSPITAL OUTPT CLINIC VISIT: HCPCS

## 2019-12-26 PROCEDURE — 87641 MR-STAPH DNA AMP PROBE: CPT

## 2019-12-26 PROCEDURE — 87205 SMEAR GRAM STAIN: CPT

## 2019-12-26 PROCEDURE — 87640 STAPH A DNA AMP PROBE: CPT

## 2019-12-26 PROCEDURE — 87070 CULTURE OTHR SPECIMN AEROBIC: CPT

## 2019-12-26 PROCEDURE — 10060 I&D ABSCESS SIMPLE/SINGLE: CPT

## 2019-12-26 PROCEDURE — 99212 OFFICE O/P EST SF 10 MIN: CPT

## 2019-12-26 NOTE — UC
Skin Complaint HPI





- HPI Summary


HPI Summary: 





Pt presents with c/o tender lump on anterior chest wall, proximal sternum. Pt 

states that he has hand ao non tender "lump there for years and over the last 

two days it has become increasingly tender, red and bigger. 





- History of Current Complaint


Chief Complaint: UCSkin


Time Seen by Provider: 12/26/19 12:51


Stated Complaint: SKIN COMPLAINT


Hx Obtained From: Patient


Onset/Duration: Gradual Onset, Lasting Days, Still Present, Worse Since - the 

last 48 hours


Skin Exposure Onset/Duration: Weeks Ago


Timing: Constant


Onset Severity: Mild


Current Severity: Moderate


Pain Intensity: 1


Location: Discrete - anterior mid sternum


Character: Swelling, Pain, Redness, Raised, Painful


Aggravating Factor(s): Touch


Alleviating Factor(s): Nothing


Associated Signs & Symptoms: Positive: Tenderness





- Allergy/Home Medications


Allergies/Adverse Reactions: 


 Allergies











Allergy/AdvReac Type Severity Reaction Status Date / Time


 


No Known Allergies Allergy   Verified 12/26/19 12:45














PMH/Surg Hx/FS Hx/Imm Hx


Previously Healthy: Yes





- Surgical History


Surgical History: Yes


Surgery Procedure, Year, and Place: back surgeries x3





- Family History


Known Family History: Positive: Cardiac Disease


   Negative: Blood Disorder





- Social History


Occupation: Disabled


Lives: Alone


Alcohol Use: Daily


Alcohol Amount: 1/2 bottle vodka


Substance Use Type: Marijuana


Substance Use Comment - Amount & Last Used: Daily


Smoking Status (MU): Heavy Every Day Tobacco Smoker


Type: Cigarettes


Amount Used/How Often: 1 PPD


Length of Time of Smoking/Using Tobacco: Since Age 16


Have You Smoked in the Last Year: Yes





Review of Systems


All Other Systems Reviewed And Are Negative: Yes


Constitutional: Positive: Negative


Skin: Positive: Other - swelling, tender lump


Eyes: Positive: Negative


ENT: Positive: Negative


Respiratory: Positive: Negative


Cardiovascular: Positive: Negative


Gastrointestinal: Positive: Negative


Genitourinary: Positive: Negative


Motor: Positive: Negative, Other - pt has hx of spinal cord injury


Neurovascular: Positive: Negative


Musculoskeletal: Positive: Negative


Neurological: Positive: Negative


Psychological: Positive: Negative


Is Patient Immunocompromised?: No





Physical Exam


Triage Information Reviewed: Yes


Appearance: Well-Appearing


Vital Signs: 


 Initial Vital Signs











Temp  98.5 F   12/26/19 12:40


 


Pulse  84   12/26/19 12:40


 


Resp  16   12/26/19 12:40


 


BP  129/75   12/26/19 12:40


 


Pulse Ox  100   12/26/19 12:40











Vital Signs Reviewed: Yes


Eye Exam: Normal


ENT Exam: Normal


Dental Exam: Normal


Neck exam: Normal


Respiratory Exam: Normal


Respiratory: Positive: No respiratory distress


Musculoskeletal Exam: Normal - at baseline, uses canew to ambulate


Neurological Exam: Normal


Psychological Exam: Normal


Skin Exam: Other - ~ 3 cm in diameter, riased tender mass, modreately 

erythematous, moveable





Procedures





- Incision and Drainage


  ** Anterior Midline Proximal Chest


Site: anterior chest, mid sternal, proxiaml


Anesthesia: Local


Instrument(s): Scalpel - 11 large amount of purulent drainage and sanguinous 

drainage. 





Course/Dx





- Course


Course Of Treatment: 





4 cc of 1% lidocaine, injected, large amount of purulent and sanguineous 

drainage.





- Differential Diagnoses - Skin Complaint


Differential Diagnoses: Abscess, Cellulitis, MRSA





- Diagnoses


Provider Diagnosis: 


 Abscess of chest wall








Discharge ED





- Sign-Out/Discharge


Documenting (check all that apply): Patient Departure


All imaging exams completed and their final reports reviewed: No Studies





- Discharge Plan


Condition: Stable


Disposition: HOME


Prescriptions: 


Cephalexin CAP* [Keflex 500 CAP*] 500 mg PO Q6H #40 cap


Patient Education Materials:  Abscess (ED)


Referrals: 


Graciela Ceballos MD [Primary Care Provider] - If Needed





- Billing Disposition and Condition


Condition: STABLE


Disposition: Home





- Attestation Statements


Provider Attestation: 





Per institutional requirements, I have reviewed the chart, however, I was not 

consulted specifically or made aware of this patient by the  midlevel provider.

  I did not personally evaluate, interact with , or disposition  this patient.